# Patient Record
(demographics unavailable — no encounter records)

---

## 2025-03-25 NOTE — ASSESSMENT
[FreeTextEntry1] : 20 M w/ spontaneously draining pilonidal abscess.  Plan: I recommended I&D of the pilonidal abscess under anesthesia. He would like to be treated non-operatively for now. Local hygiene. Clip the hair in the pilonidal region and around the abscess. While wiping after a bowel movement, try and keep away from the pilonidal abscess (which is close to the anus). Augmentin x 1 week. See again after 2-3 weeks (sooner if required). All questions answered.

## 2025-03-25 NOTE — PHYSICAL EXAM
[Exam Deferred] : exam was deferred [No Rash or Lesion] : No rash or lesion [Alert] : alert [Oriented to Person] : oriented to person [Oriented to Place] : oriented to place [Oriented to Time] : oriented to time [Calm] : calm [de-identified] : Normal [de-identified] : Pilonidal abscess, spontaneously draining; inferior aspect close to the anus [de-identified] : Normal [de-identified] : Normal [de-identified] : Normal [de-identified] : Normal [de-identified] : Normal [de-identified] : Normal

## 2025-03-25 NOTE — HISTORY OF PRESENT ILLNESS
[FreeTextEntry1] : 20-year-old male pt here with a pilonidal cyst Went to an urgent care in December and the doctor believed it was a pilonidal cyst and put pt on doxycycline for 10 days and that seemed to help and referred him to [facility]. This past Wednesday he went to the bathroom and when he wiped there was blood that continued for several days with pus as well. Denies fevers or chills  Moving his bowels regularly On Wednesday it was painful to sit but not anymore since it drained  Denies pain but it's still leaking onto his underwear.

## 2025-04-15 NOTE — PHYSICAL EXAM
[Exam Deferred] : exam was deferred [No Rash or Lesion] : No rash or lesion [Alert] : alert [Oriented to Person] : oriented to person [Oriented to Place] : oriented to place [Oriented to Time] : oriented to time [Calm] : calm [de-identified] : Normal [de-identified] : The pilonidal abscess has spontaneously drained; inferior aspect close to the anus [de-identified] : Normal [de-identified] : Normal [de-identified] : Normal [de-identified] : Normal [de-identified] : Normal [de-identified] : Normal

## 2025-04-15 NOTE — HISTORY OF PRESENT ILLNESS
[FreeTextEntry1] : 20-year-old male pt here because his pilonidal cyst was draining pus and blood on Friday. Last seen on 3/25/25 At that time I & D was recommended but pt wanted nonoperative measures. On Thursday he went to the bathroom and saw blood in the stool and in the toilet and when he wiped, he saw blood and pus on the tissue. He has been draining pus ever since then. Denies pain Moving his bowels regularly Denies fevers or chills

## 2025-04-15 NOTE — PHYSICAL EXAM
[Exam Deferred] : exam was deferred [No Rash or Lesion] : No rash or lesion [Alert] : alert [Oriented to Person] : oriented to person [Oriented to Place] : oriented to place [Oriented to Time] : oriented to time [Calm] : calm [de-identified] : Normal [de-identified] : The pilonidal abscess has spontaneously drained; inferior aspect close to the anus [de-identified] : Normal [de-identified] : Normal [de-identified] : Normal [de-identified] : Normal [de-identified] : Normal [de-identified] : Normal

## 2025-04-15 NOTE — ASSESSMENT
[FreeTextEntry1] : 20 M here for follow up. The pilonidal abscess has spontaneously drained.   Plan: Local hygiene. Clip the hair in the pilonidal region and around the abscess. While wiping after a bowel movement, try and keep away from the pilonidal abscess (which is close to the anus). Discussed surgery: excision of pilonidal cyst and abscess. Possible complications, prolonged healing and dressing time, and usual recovery and restrictions discussed. He will call us to schedule. All questions answered.

## 2025-05-27 NOTE — ASSESSMENT
[FreeTextEntry1] : 20 M here for postop visit, after recent excision of pilonidal cyst/abscess. He is doing well and on exam, the surgical wound is clean and healing well. Plan: Daily dressing. To see again after 2 weeks. All questions answered.

## 2025-05-27 NOTE — HISTORY OF PRESENT ILLNESS
[FreeTextEntry1] : 20-year-old male pt s/p excision of pilonidal cyst with abscess on 5/12/25  Pt is here for post op visit.  Doing well. No pain. The patient's relative is a RN and is doing the dressings. She reported the wound was 'healing fine'.

## 2025-05-27 NOTE — PHYSICAL EXAM
[Exam Deferred] : exam was deferred [No Rash or Lesion] : No rash or lesion [Alert] : alert [Oriented to Person] : oriented to person [Oriented to Place] : oriented to place [Oriented to Time] : oriented to time [Calm] : calm [de-identified] : Normal [de-identified] : The pilonidal surgical wound was clean and healing well; we changed the dressing [de-identified] : Normal [de-identified] : Normal [de-identified] : Normal [de-identified] : Normal [de-identified] : Normal [de-identified] : Normal

## 2025-05-27 NOTE — PHYSICAL EXAM
[Exam Deferred] : exam was deferred [No Rash or Lesion] : No rash or lesion [Alert] : alert [Oriented to Person] : oriented to person [Oriented to Place] : oriented to place [Oriented to Time] : oriented to time [Calm] : calm [de-identified] : Normal [de-identified] : The pilonidal surgical wound was clean and healing well; we changed the dressing [de-identified] : Normal [de-identified] : Normal [de-identified] : Normal [de-identified] : Normal [de-identified] : Normal [de-identified] : Normal

## 2025-06-04 NOTE — HEALTH RISK ASSESSMENT
[Fair] :  ~his/her~ mood as fair [No] : No [Never (0 pts)] : Never (0 points) [0] : 2) Feeling down, depressed, or hopeless: Not at all (0) [PHQ-2 Negative - No further assessment needed] : PHQ-2 Negative - No further assessment needed [Patient declined Retinal Exam] : Patient declined Retinal Exam. [HIV test declined] : HIV test declined [Hepatitis C test declined] : Hepatitis C test declined [With Family] : lives with family [Student] : student [Single] : single [Feels Safe at Home] : Feels safe at home [Fully functional (bathing, dressing, toileting, transferring, walking, feeding)] : Fully functional (bathing, dressing, toileting, transferring, walking, feeding) [Fully functional (using the telephone, shopping, preparing meals, housekeeping, doing laundry, using] : Fully functional and needs no help or supervision to perform IADLs (using the telephone, shopping, preparing meals, housekeeping, doing laundry, using transportation, managing medications and managing finances) [Smoke Detector] : smoke detector [Carbon Monoxide Detector] : carbon monoxide detector [Never] : Never [Audit-CScore] : 0 [[id number]] : 0 [Change in mental status noted] : No change in mental status noted [Reports changes in hearing] : Reports no changes in hearing [Reports changes in vision] : Reports no changes in vision [Reports changes in dental health] : Reports no changes in dental health

## 2025-06-04 NOTE — HEALTH RISK ASSESSMENT
[Fair] :  ~his/her~ mood as fair [No] : No [Never (0 pts)] : Never (0 points) [0] : 2) Feeling down, depressed, or hopeless: Not at all (0) [PHQ-2 Negative - No further assessment needed] : PHQ-2 Negative - No further assessment needed [Patient declined Retinal Exam] : Patient declined Retinal Exam. [HIV test declined] : HIV test declined [Hepatitis C test declined] : Hepatitis C test declined [With Family] : lives with family [Student] : student [Single] : single [Feels Safe at Home] : Feels safe at home [Fully functional (bathing, dressing, toileting, transferring, walking, feeding)] : Fully functional (bathing, dressing, toileting, transferring, walking, feeding) [Fully functional (using the telephone, shopping, preparing meals, housekeeping, doing laundry, using] : Fully functional and needs no help or supervision to perform IADLs (using the telephone, shopping, preparing meals, housekeeping, doing laundry, using transportation, managing medications and managing finances) [Smoke Detector] : smoke detector [Carbon Monoxide Detector] : carbon monoxide detector [Never] : Never [Audit-CScore] : 0 [[id number]] : 0 [Change in mental status noted] : No change in mental status noted [Reports changes in hearing] : Reports no changes in hearing [Reports changes in vision] : Reports no changes in vision [Reports changes in dental health] : Reports no changes in dental health

## 2025-06-10 NOTE — ASSESSMENT
[FreeTextEntry1] : 20 M here for follow up. He is doing well and on exam, the surgical wound is smaller, clean and healing well; no infection. Plan: Daily dressing. To see again after 1 month.  All questions answered.

## 2025-06-10 NOTE — HISTORY OF PRESENT ILLNESS
[FreeTextEntry1] : 20-year-old male pt s/p excision of pilonidal cyst with abscess on 5/12/25 Last seen on 5/27/25  Here for follow up Within the last 5 days more bleeding on the gauze with green color on the gauze. No fever; minimal pain.

## 2025-06-10 NOTE — PHYSICAL EXAM
[Exam Deferred] : exam was deferred [No Rash or Lesion] : No rash or lesion [Alert] : alert [Oriented to Person] : oriented to person [Oriented to Time] : oriented to time [Oriented to Place] : oriented to place [Calm] : calm [de-identified] : Normal [de-identified] : The surgical wound was smaller, clean and healing well; we changed the dressing [de-identified] : Normal [de-identified] : Normal [de-identified] : Normal [de-identified] : Normal [de-identified] : Normal [de-identified] : Normal

## 2025-06-10 NOTE — PHYSICAL EXAM
[Exam Deferred] : exam was deferred [No Rash or Lesion] : No rash or lesion [Alert] : alert [Oriented to Person] : oriented to person [Oriented to Place] : oriented to place [Oriented to Time] : oriented to time [Calm] : calm [de-identified] : Normal [de-identified] : The surgical wound was smaller, clean and healing well; we changed the dressing [de-identified] : Normal [de-identified] : Normal [de-identified] : Normal [de-identified] : Normal [de-identified] : Normal [de-identified] : Normal

## 2025-06-24 NOTE — PHYSICAL EXAM
[Exam Deferred] : exam was deferred [No Rash or Lesion] : No rash or lesion [Alert] : alert [Oriented to Person] : oriented to person [Oriented to Place] : oriented to place [Oriented to Time] : oriented to time [Calm] : calm [de-identified] : Normal [de-identified] : The surgical wound was smaller, clean and healing well; we changed the dressing, and shaved the hair around the wound [de-identified] : Normal [de-identified] : Normal [de-identified] : Normal [de-identified] : Normal [de-identified] : Normal [de-identified] : Normal

## 2025-06-24 NOTE — PHYSICAL EXAM
[Exam Deferred] : exam was deferred [No Rash or Lesion] : No rash or lesion [Alert] : alert [Oriented to Person] : oriented to person [Oriented to Place] : oriented to place [Oriented to Time] : oriented to time [Calm] : calm [de-identified] : Normal [de-identified] : The surgical wound was smaller, clean and healing well; we changed the dressing, and shaved the hair around the wound [de-identified] : Normal [de-identified] : Normal [de-identified] : Normal [de-identified] : Normal [de-identified] : Normal [de-identified] : Normal

## 2025-06-24 NOTE — ASSESSMENT
[FreeTextEntry1] : 20 M here for follow up. He is doing well and on exam, the surgical wound is not infected, smaller, clean and healing well; we shaved the hair around the wound. Plan: Daily dressing. To see again after 1 week.   All questions answered.

## 2025-06-24 NOTE — HISTORY OF PRESENT ILLNESS
[FreeTextEntry1] : 20-year-old male pt s/p excision of pilonidal cyst with abscess with cyst on 5/12/25 Last seen 6/10/25 Here for follow up Pt is complaining of some itchiness in the area and some yellowish green drainage.  The drainage started last week The wound is smaller per his aunt His aunt is a nurse, and she saw the drainage and cleaned the wound. She also saw some hair coming from the wound. Denies fevers or chills Here just to make sure it's not infected He has pain in certain positions. He has to reposition to take the pain away.

## 2025-07-02 NOTE — HISTORY OF PRESENT ILLNESS
[FreeTextEntry1] : 20-year-old male pt s/p excision of pilonidal cyst with abscess on 5/12/25 Last seen 6/24/25  Here for follow up Pt reports feeling good Denies pain  Still has a little yellowish drainage on the dressing yesterday and a little bit of blood prior to that Denies fevers or chills

## 2025-07-02 NOTE — ASSESSMENT
[FreeTextEntry1] : 20 M here for follow up. He is doing well and on exam, the surgical wound is smaller, clean and healing well. Plan: Daily dressing. To see again after 2 weeks.   All questions answered.

## 2025-07-02 NOTE — PHYSICAL EXAM
[Exam Deferred] : exam was deferred [No Rash or Lesion] : No rash or lesion [Alert] : alert [Oriented to Person] : oriented to person [Oriented to Place] : oriented to place [Oriented to Time] : oriented to time [Calm] : calm [de-identified] : Normal [de-identified] : The pilonidal surgical wound was smaller, clean and healing well [de-identified] : Normal [de-identified] : Normal [de-identified] : Normal [de-identified] : Normal [de-identified] : Normal [de-identified] : Normal

## 2025-07-22 NOTE — ASSESSMENT
[FreeTextEntry1] : 20 M here for follow up. He is reporting bleeding from his pilonidal wound. On exam, no active bleeding or infection; the surgical wound is small, clean and healing well. Plan: Daily dressing. Refer to wound center for advice for wound care. To see again after 1 month.    All questions answered.

## 2025-07-22 NOTE — PHYSICAL EXAM
[Exam Deferred] : exam was deferred [No Rash or Lesion] : No rash or lesion [Alert] : alert [Oriented to Person] : oriented to person [Oriented to Place] : oriented to place [Oriented to Time] : oriented to time [Calm] : calm [de-identified] : Normal [de-identified] : The pilonidal surgical wound was clean, small and healing well; no infection, no bleeding [de-identified] : Normal [de-identified] : Normal [de-identified] : Normal [de-identified] : Normal [de-identified] : Normal [de-identified] : Normal

## 2025-07-22 NOTE — HISTORY OF PRESENT ILLNESS
[FreeTextEntry1] : 20-year-old male pt s/p excision of pilonidal cyst with abscess on 5/12/25 Last seen 7/2/25  Here for follow up. Here with his aunt who is a nurse and has been doing his dressings in the past. Last Friday, it started bleeding through his shorts. Some purulent drainage was noted Today he sat down to void and the toilet bowl was filled with blood from the wound He denies pain Denies fevers or chills. Over the weekend he changed the dressing once a day. Was painful when the dressing was changed more so than it has been

## 2025-07-22 NOTE — PHYSICAL EXAM
[Exam Deferred] : exam was deferred [No Rash or Lesion] : No rash or lesion [Alert] : alert [Oriented to Person] : oriented to person [Oriented to Place] : oriented to place [Oriented to Time] : oriented to time [Calm] : calm [de-identified] : Normal [de-identified] : The pilonidal surgical wound was clean, small and healing well; no infection, no bleeding [de-identified] : Normal [de-identified] : Normal [de-identified] : Normal [de-identified] : Normal [de-identified] : Normal [de-identified] : Normal